# Patient Record
Sex: MALE | Race: WHITE | NOT HISPANIC OR LATINO | URBAN - METROPOLITAN AREA
[De-identification: names, ages, dates, MRNs, and addresses within clinical notes are randomized per-mention and may not be internally consistent; named-entity substitution may affect disease eponyms.]

---

## 2024-08-12 ENCOUNTER — HOSPITAL ENCOUNTER (EMERGENCY)
Facility: HOSPITAL | Age: 69
Discharge: HOME | End: 2024-08-12
Attending: EMERGENCY MEDICINE
Payer: MEDICARE

## 2024-08-12 VITALS
DIASTOLIC BLOOD PRESSURE: 93 MMHG | SYSTOLIC BLOOD PRESSURE: 182 MMHG | OXYGEN SATURATION: 98 % | BODY MASS INDEX: 27.18 KG/M2 | WEIGHT: 143.94 LBS | TEMPERATURE: 98 F | HEART RATE: 45 BPM | RESPIRATION RATE: 18 BRPM | HEIGHT: 61 IN

## 2024-08-12 DIAGNOSIS — R10.31 RIGHT GROIN PAIN: ICD-10-CM

## 2024-08-12 DIAGNOSIS — N20.0 NEPHROLITHIASIS: Primary | ICD-10-CM

## 2024-08-12 DIAGNOSIS — R10.9 ABDOMINAL PAIN: ICD-10-CM

## 2024-08-12 LAB
ALBUMIN SERPL BCP-MCNC: 4.2 G/DL (ref 3.5–5.2)
ALP SERPL-CCNC: 63 U/L (ref 55–135)
ALT SERPL W/O P-5'-P-CCNC: 45 U/L (ref 10–44)
ANION GAP SERPL CALC-SCNC: 12 MMOL/L (ref 8–16)
AST SERPL-CCNC: 31 U/L (ref 10–40)
BASOPHILS # BLD AUTO: 0.06 K/UL (ref 0–0.2)
BASOPHILS NFR BLD: 0.5 % (ref 0–1.9)
BILIRUB SERPL-MCNC: 2 MG/DL (ref 0.1–1)
BILIRUB UR QL STRIP: NEGATIVE
BUN SERPL-MCNC: 24 MG/DL (ref 8–23)
CALCIUM SERPL-MCNC: 9.9 MG/DL (ref 8.7–10.5)
CHLORIDE SERPL-SCNC: 108 MMOL/L (ref 95–110)
CLARITY UR: CLEAR
CO2 SERPL-SCNC: 18 MMOL/L (ref 23–29)
COLOR UR: YELLOW
CREAT SERPL-MCNC: 1.8 MG/DL (ref 0.5–1.4)
DIFFERENTIAL METHOD BLD: ABNORMAL
EOSINOPHIL # BLD AUTO: 0.2 K/UL (ref 0–0.5)
EOSINOPHIL NFR BLD: 1.7 % (ref 0–8)
ERYTHROCYTE [DISTWIDTH] IN BLOOD BY AUTOMATED COUNT: 12.3 % (ref 11.5–14.5)
EST. GFR  (NO RACE VARIABLE): 40 ML/MIN/1.73 M^2
GLUCOSE SERPL-MCNC: 92 MG/DL (ref 70–110)
GLUCOSE UR QL STRIP: NEGATIVE
HCT VFR BLD AUTO: 43.9 % (ref 40–54)
HGB BLD-MCNC: 15.4 G/DL (ref 14–18)
HGB UR QL STRIP: ABNORMAL
IMM GRANULOCYTES # BLD AUTO: 0.02 K/UL (ref 0–0.04)
IMM GRANULOCYTES NFR BLD AUTO: 0.2 % (ref 0–0.5)
KETONES UR QL STRIP: NEGATIVE
LACTATE SERPL-SCNC: 0.9 MMOL/L (ref 0.5–2.2)
LEUKOCYTE ESTERASE UR QL STRIP: NEGATIVE
LIPASE SERPL-CCNC: 32 U/L (ref 4–60)
LYMPHOCYTES # BLD AUTO: 0.8 K/UL (ref 1–4.8)
LYMPHOCYTES NFR BLD: 6.7 % (ref 18–48)
MCH RBC QN AUTO: 30.1 PG (ref 27–31)
MCHC RBC AUTO-ENTMCNC: 35.1 G/DL (ref 32–36)
MCV RBC AUTO: 86 FL (ref 82–98)
MONOCYTES # BLD AUTO: 1.2 K/UL (ref 0.3–1)
MONOCYTES NFR BLD: 10.5 % (ref 4–15)
NEUTROPHILS # BLD AUTO: 9.5 K/UL (ref 1.8–7.7)
NEUTROPHILS NFR BLD: 80.4 % (ref 38–73)
NITRITE UR QL STRIP: NEGATIVE
NRBC BLD-RTO: 0 /100 WBC
OHS QRS DURATION: 88 MS
OHS QTC CALCULATION: 413 MS
PH UR STRIP: 6 [PH] (ref 5–8)
PLATELET # BLD AUTO: 160 K/UL (ref 150–450)
PMV BLD AUTO: 10.2 FL (ref 9.2–12.9)
POTASSIUM SERPL-SCNC: 4.1 MMOL/L (ref 3.5–5.1)
PROT SERPL-MCNC: 7.7 G/DL (ref 6–8.4)
PROT UR QL STRIP: ABNORMAL
RBC # BLD AUTO: 5.11 M/UL (ref 4.6–6.2)
SODIUM SERPL-SCNC: 138 MMOL/L (ref 136–145)
SP GR UR STRIP: 1.01 (ref 1–1.03)
URN SPEC COLLECT METH UR: ABNORMAL
UROBILINOGEN UR STRIP-ACNC: NEGATIVE EU/DL
WBC # BLD AUTO: 11.75 K/UL (ref 3.9–12.7)

## 2024-08-12 PROCEDURE — 93005 ELECTROCARDIOGRAM TRACING: CPT

## 2024-08-12 PROCEDURE — 85025 COMPLETE CBC W/AUTO DIFF WBC: CPT | Performed by: EMERGENCY MEDICINE

## 2024-08-12 PROCEDURE — 25000003 PHARM REV CODE 250: Performed by: EMERGENCY MEDICINE

## 2024-08-12 PROCEDURE — 81003 URINALYSIS AUTO W/O SCOPE: CPT | Performed by: EMERGENCY MEDICINE

## 2024-08-12 PROCEDURE — 25500020 PHARM REV CODE 255: Performed by: EMERGENCY MEDICINE

## 2024-08-12 PROCEDURE — 83605 ASSAY OF LACTIC ACID: CPT | Performed by: EMERGENCY MEDICINE

## 2024-08-12 PROCEDURE — 83690 ASSAY OF LIPASE: CPT | Performed by: EMERGENCY MEDICINE

## 2024-08-12 PROCEDURE — 93010 ELECTROCARDIOGRAM REPORT: CPT | Mod: ,,, | Performed by: INTERNAL MEDICINE

## 2024-08-12 PROCEDURE — 99285 EMERGENCY DEPT VISIT HI MDM: CPT | Mod: 25

## 2024-08-12 PROCEDURE — 80053 COMPREHEN METABOLIC PANEL: CPT | Performed by: EMERGENCY MEDICINE

## 2024-08-12 PROCEDURE — 87591 N.GONORRHOEAE DNA AMP PROB: CPT | Performed by: EMERGENCY MEDICINE

## 2024-08-12 PROCEDURE — 99900035 HC TECH TIME PER 15 MIN (STAT)

## 2024-08-12 RX ORDER — ACETAMINOPHEN 500 MG
1000 TABLET ORAL
Status: COMPLETED | OUTPATIENT
Start: 2024-08-12 | End: 2024-08-12

## 2024-08-12 RX ORDER — TAMSULOSIN HYDROCHLORIDE 0.4 MG/1
0.8 CAPSULE ORAL
Status: COMPLETED | OUTPATIENT
Start: 2024-08-12 | End: 2024-08-12

## 2024-08-12 RX ORDER — TRAMADOL HYDROCHLORIDE 50 MG/1
50 TABLET ORAL EVERY 6 HOURS PRN
Qty: 12 TABLET | Refills: 0 | Status: SHIPPED | OUTPATIENT
Start: 2024-08-12

## 2024-08-12 RX ORDER — TAMSULOSIN HYDROCHLORIDE 0.4 MG/1
0.4 CAPSULE ORAL DAILY
Qty: 10 CAPSULE | Refills: 0 | Status: SHIPPED | OUTPATIENT
Start: 2024-08-12 | End: 2025-08-12

## 2024-08-12 RX ADMIN — IOHEXOL 75 ML: 350 INJECTION, SOLUTION INTRAVENOUS at 12:08

## 2024-08-12 RX ADMIN — TAMSULOSIN HYDROCHLORIDE 0.8 MG: 0.4 CAPSULE ORAL at 01:08

## 2024-08-12 RX ADMIN — ACETAMINOPHEN 1000 MG: 500 TABLET ORAL at 11:08

## 2024-08-12 NOTE — ED PROVIDER NOTES
Encounter Date: 8/12/2024       History     Chief Complaint   Patient presents with    Abdominal Pain    Groin Pain     HPI  Patient is a 69-year-old white male past medical history of hypertension presenting with midline abdominal pain radiating to the groin for 2 days.  He has been taking ibuprofen with food leave for few hours.  He endorses associated nausea.  His last bowel movement was today and normal in quality.  He denies hematuria, dysuria, fever, chills, vomiting, blood in stool.  At its worst pain is rated 8/10 and associated with nausea with radiation to the testicles.  He notes the pain is worse in the right testicle.  Patient endorses remote history of hernia repair years ago.    Review of patient's allergies indicates:   Allergen Reactions    Sulfa (sulfonamide antibiotics)      Past Medical History:   Diagnosis Date    Essential (primary) hypertension      Past Surgical History:   Procedure Laterality Date    APPENDECTOMY      HERNIA REPAIR       No family history on file.  Social History     Tobacco Use    Smoking status: Former     Types: Cigarettes    Smokeless tobacco: Never   Substance Use Topics    Alcohol use: Yes     Comment: occ     Review of Systems   Constitutional:  Negative for chills and fever.   HENT:  Negative for rhinorrhea.    Respiratory:  Negative for cough.    Cardiovascular:  Negative for chest pain.   Gastrointestinal:  Positive for abdominal pain and nausea. Negative for vomiting.   Genitourinary:  Positive for testicular pain.   Musculoskeletal:  Negative for back pain.   Skin:  Negative for wound.   Neurological:  Negative for dizziness and weakness.   All other systems reviewed and are negative.    Social Determinants of Health     Tobacco Use: Medium Risk (8/12/2024)    Patient History     Smoking Tobacco Use: Former     Smokeless Tobacco Use: Never     Passive Exposure: Not on file   Alcohol Use: Not on file   Financial Resource Strain: Not on file   Food Insecurity: Not  on file   Transportation Needs: Not on file   Physical Activity: Not on file   Stress: Not on file   Housing Stability: Not on file   Depression: Not on file   Utilities: Not on file   Health Literacy: Not on file   Social Isolation: Not on file       Physical Exam     Initial Vitals [08/12/24 1037]   BP Pulse Resp Temp SpO2   (!) 146/91 60 20 98.1 °F (36.7 °C) 99 %      MAP       --         Physical Exam    Nursing note and vitals reviewed.  Constitutional: He appears well-developed and well-nourished.   HENT:   Head: Normocephalic and atraumatic.   Mouth/Throat: Oropharynx is clear and moist.   Eyes: EOM are normal. Pupils are equal, round, and reactive to light.   Neck: No JVD present.   Normal range of motion.  Cardiovascular:  Normal rate and intact distal pulses.           Pulmonary/Chest: Breath sounds normal. No stridor.   Abdominal: Abdomen is soft. There is no abdominal tenderness. There is no rebound.   Musculoskeletal:         General: Normal range of motion.      Cervical back: Normal range of motion.     Neurological: He is alert and oriented to person, place, and time. GCS score is 15. GCS eye subscore is 4. GCS verbal subscore is 5. GCS motor subscore is 6.   Skin: Skin is warm. Capillary refill takes less than 2 seconds.         ED Course   Procedures  Labs Reviewed   CBC W/ AUTO DIFFERENTIAL - Abnormal       Result Value    WBC 11.75      RBC 5.11      Hemoglobin 15.4      Hematocrit 43.9      MCV 86      MCH 30.1      MCHC 35.1      RDW 12.3      Platelets 160      MPV 10.2      Immature Granulocytes 0.2      Gran # (ANC) 9.5 (*)     Immature Grans (Abs) 0.02      Lymph # 0.8 (*)     Mono # 1.2 (*)     Eos # 0.2      Baso # 0.06      nRBC 0      Gran % 80.4 (*)     Lymph % 6.7 (*)     Mono % 10.5      Eosinophil % 1.7      Basophil % 0.5      Differential Method Automated     COMPREHENSIVE METABOLIC PANEL - Abnormal    Sodium 138      Potassium 4.1      Chloride 108      CO2 18 (*)     Glucose 92       BUN 24 (*)     Creatinine 1.8 (*)     Calcium 9.9      Total Protein 7.7      Albumin 4.2      Total Bilirubin 2.0 (*)     Alkaline Phosphatase 63      AST 31      ALT 45 (*)     eGFR 40 (*)     Anion Gap 12     URINALYSIS, REFLEX TO URINE CULTURE - Abnormal    Specimen UA Urine, Clean Catch      Color, UA Yellow      Appearance, UA Clear      pH, UA 6.0      Specific Gravity, UA 1.015      Protein, UA Trace (*)     Glucose, UA Negative      Ketones, UA Negative      Bilirubin (UA) Negative      Occult Blood UA Trace (*)     Nitrite, UA Negative      Urobilinogen, UA Negative      Leukocytes, UA Negative      Narrative:     Specimen Source->Urine   LIPASE    Lipase 32     LACTIC ACID, PLASMA    Lactate (Lactic Acid) 0.9     C. TRACHOMATIS/N. GONORRHOEAE BY AMP DNA        ECG Results              EKG 12-lead (Final result)        Collection Time Result Time QRS Duration [facility] QTC Calculation    08/12/24 11:15:00 08/12/24 13:17:18 88 413                     Final result by Interface, Lab In [facility] (08/12/24 13:17:28)                   Narrative:    Test Reason : R10.9    Vent. Rate : 049 BPM     Atrial Rate : 049 BPM     P-R Int : 148 ms          QRS Dur : 088 ms      QT Int : 458 ms       P-R-T Axes : 033 084 046 degrees     QTc Int : 413 ms    Sinus bradycardia  Otherwise normal ECG  No previous ECGs available  Confirmed by Junaid FISHMAN, Santino ARITA ([id number]) on 8/12/2024 1:17:16 PM    Referred By: [email or web address]   SELF           Confirmed By:Santino Quiroga MD                                  Imaging Results              US Scrotum And Testicles (Final result)  Result time 08/12/24 12:54:09      Final result by Jessica Alvarado MD (08/12/24 12:54:09)                   Impression:      Imaging findings suspicious for right-sided epididymal orchitis.  There is no evidence for torsion.    Moderate to large bilateral hydroceles with internal debris.    Bilateral epididymal cysts.    Left-sided varicocele.      Electronically  signed by: Jessica Alvarado MD  Date:    08/12/2024  Time:    12:54               Narrative:    EXAMINATION:  US SCROTUM AND TESTICLES    CLINICAL HISTORY:  Right lower quadrant pain    TECHNIQUE:  Sonography of the scrotum and testes.    COMPARISON:  None.    FINDINGS:  Right Testicle:    *Size: 4.4 x 2.8 x 2.7 cm  *Appearance: Normal.  *Flow: Increased vascularity.  *Epididymis: Septated epididymal head cyst measures 0.9 cm.  Other smaller cyst also present throughout the epididymal head and proximal body.  Mild increased vascularity of the epididymis.  *Hydrocele: Moderate to large with internal debris.  *Varicocele: None.  .    Left Testicle:    *Size: 3.9 x 2.9 x 2.6 cm  *Appearance: Normal.  *Flow: Normal arterial and venous flow  *Epididymis: Small epididymal head cyst measuring up to 0.4 cm.  *Hydrocele: Moderate to large with echogenic debris.  *Varicocele: Present.  .    Other findings: None.                                       CT Abdomen Pelvis With IV Contrast NO Oral Contrast (Final result)  Result time 08/12/24 12:48:06      Final result by Jessica Alvarado MD (08/12/24 12:48:06)                   Impression:      Mild moderate right-sided hydroureteronephrosis secondary to a 3.5 mm stone in the right mid ureter.  There is prominent perinephric inflammation likely related to obstructive physiology although superimposed infection not excluded.  Correlation with urinalysis recommended.    Significant prostatomegaly with heterogeneity of the prostate gland.  Correlation with PSA level is recommended.    Irregular region of sclerosis in the medial right iliac bone, indeterminate.  If prior imaging is available, it should be submitted for comparison purposes.      Electronically signed by: Jessica Alvarado MD  Date:    08/12/2024  Time:    12:48               Narrative:    EXAMINATION:  CT ABDOMEN PELVIS WITH IV CONTRAST    CLINICAL HISTORY:  Abdominal pain, acute, nonlocalized;suspect strangulated vs  incarcareted hernia;    TECHNIQUE:  Low dose axial images, sagittal and coronal reformations were obtained from the lung bases to the pubic symphysis following the IV administration of 75 mL of Omnipaque 350 .  Oral contrast was not given.    COMPARISON:  08/12/2024    FINDINGS:  Bibasilar subsegmental atelectasis.  No basilar consolidation or pleural effusions.  The base of the heart shows calcified coronary artery disease.  Calcified atheromatous disease affects the aorta and its major branch vessels.    No radiopaque gallstones are seen.  No intrahepatic or extrahepatic biliary ductal dilatation is identified.  The liver, spleen, pancreas, adrenal glands and kidneys are normal in size, shape and contour.  The right kidney is edematous.  There is mild moderate right-sided hydroureteronephrosis secondary to a 3.5 mm stone in the right mid ureter the urinary bladder is well distended.  The prostate is significantly enlarged and heterogenous in appearance.  Correlation with PSA level is recommended.    Diverticulosis coli is seen without diverticulitis.  Constipation.  No free air, free fluid or obstruction.  Postoperative changes of prior abdominal wall hernia repair seen.  No pathologically enlarged abdominal or pelvic lymph nodes are detected.    Age-appropriate degenerative changes affect the skeleton.  Irregular region of sclerosis in the right medial iliac wing, indeterminate                                       Medications   acetaminophen tablet 1,000 mg (1,000 mg Oral Given 8/12/24 1155)   iohexoL (OMNIPAQUE 350) injection 75 mL (75 mLs Intravenous Given 8/12/24 1231)   tamsulosin 24 hr capsule 0.8 mg (0.8 mg Oral Given 8/12/24 1310)       Medical Decision Making      Patient is a 69y WM hx HTN presenting with left flank pain for the past 3 days.  Exam he is non toxic, afebrile with no CVA tenderness and no abdominal tenderness.  Labs without leukocytosis, anemia or UTI. + Blood and RBC in UA. CT shows right  kidney stone and epididymal orchitis. Patient states he has 1 sexual partner, not new in the past 5 years.  Referred to urology.  Discharged with tramadol and flomax.    DDX: UTI, nephrolithiasis, pyelonephritis    Amount and/or Complexity of Data Reviewed  Labs: ordered.  Radiology: ordered.    Risk  OTC drugs.                                      Clinical Impression:  Final diagnoses:  [R10.9] Abdominal pain  [R10.31] Right groin pain  [N20.0] Nephrolithiasis (Primary)          ED Disposition Condition    Discharge Stable          ED Prescriptions       Medication Sig Dispense Start Date End Date Auth. Provider    traMADoL (ULTRAM) 50 mg tablet Take 1 tablet (50 mg total) by mouth every 6 (six) hours as needed for Pain. 12 tablet 8/12/2024 -- Yenny Monroy MD    tamsulosin (FLOMAX) 0.4 mg Cap Take 1 capsule (0.4 mg total) by mouth once daily. 10 capsule 8/12/2024 8/12/2025 Yenny Monroy MD          Follow-up Information       Follow up With Specialties Details Why Contact Info    Tuan Hernandes MD Urology Schedule an appointment as soon as possible for a visit in 1 day  [address] [place] [id number]  [phone]               Yenny Monroy MD  08/12/24 0861

## 2024-08-12 NOTE — DISCHARGE INSTRUCTIONS
Stay well hydrated  Your maximum dose of motrin or ibuprofen is 3 g in 24 hours (3,000 mg)  Max dose of tylenol is 4g in 24 hours (4,000 mg)  Alternate tylenol and motrin for pain  Strain your urine  Return if you cannot urinate or have uncontrolled pain  Drink cranberry juice

## 2024-08-12 NOTE — ED TRIAGE NOTES
C/o intermittent mid abdominal pain radiating into the testicles and groin since yesterday. Patient reports has been taking Ibuprofen with some relief, but doesn't relieve him for long.

## 2024-08-14 LAB
C TRACH RRNA SPEC QL NAA+PROBE: NEGATIVE
N GONORRHOEA RRNA SPEC QL NAA+PROBE: NEGATIVE
N.GONORROHEAE, AMP RNA SOURCE: NORMAL
SPECIMEN SOURCE: NORMAL